# Patient Record
Sex: MALE | Race: BLACK OR AFRICAN AMERICAN | Employment: FULL TIME | URBAN - METROPOLITAN AREA
[De-identification: names, ages, dates, MRNs, and addresses within clinical notes are randomized per-mention and may not be internally consistent; named-entity substitution may affect disease eponyms.]

---

## 2024-01-11 ENCOUNTER — HOSPITAL ENCOUNTER (EMERGENCY)
Facility: HOSPITAL | Age: 24
Discharge: HOME | End: 2024-01-11
Attending: EMERGENCY MEDICINE
Payer: COMMERCIAL

## 2024-01-11 VITALS
DIASTOLIC BLOOD PRESSURE: 88 MMHG | OXYGEN SATURATION: 100 % | HEIGHT: 71 IN | HEART RATE: 63 BPM | RESPIRATION RATE: 14 BRPM | BODY MASS INDEX: 30.8 KG/M2 | TEMPERATURE: 98.4 F | SYSTOLIC BLOOD PRESSURE: 156 MMHG | WEIGHT: 220 LBS

## 2024-01-11 DIAGNOSIS — H10.31 ACUTE CONJUNCTIVITIS OF RIGHT EYE, UNSPECIFIED ACUTE CONJUNCTIVITIS TYPE: Primary | ICD-10-CM

## 2024-01-11 PROCEDURE — 99283 EMERGENCY DEPT VISIT LOW MDM: CPT

## 2024-01-11 PROCEDURE — 6370000000 HC RX 637 (ALT 250 FOR IP): Performed by: EMERGENCY MEDICINE

## 2024-01-11 RX ORDER — POLYMYXIN B SULFATE AND TRIMETHOPRIM 1; 10000 MG/ML; [USP'U]/ML
1 SOLUTION OPHTHALMIC ONCE
Status: COMPLETED | OUTPATIENT
Start: 2024-01-11 | End: 2024-01-11

## 2024-01-11 RX ORDER — POLYMYXIN B SULFATE AND TRIMETHOPRIM 1; 10000 MG/ML; [USP'U]/ML
1 SOLUTION OPHTHALMIC EVERY 4 HOURS
Qty: 1 EACH | Refills: 0 | Status: SHIPPED | OUTPATIENT
Start: 2024-01-11

## 2024-01-11 RX ADMIN — POLYMYXIN B SULFATE AND TRIMETHOPRIM 1 DROP: 10000; 1 SOLUTION OPHTHALMIC at 20:01

## 2024-01-11 ASSESSMENT — PAIN - FUNCTIONAL ASSESSMENT: PAIN_FUNCTIONAL_ASSESSMENT: NONE - DENIES PAIN

## 2024-01-11 ASSESSMENT — VISUAL ACUITY: OU: 1

## 2024-01-12 NOTE — ED TRIAGE NOTES
Pt.presents for evaluation of redness to right eye onset yesterday.. pt.states today he woke up with cold in his eye.  Using warm compresses.  Does not wear contacts/glasses.  Not around anyone with pinkeye that he is aware of.  Denies injury

## 2024-01-12 NOTE — ED NOTES
Assumed care of pt for dc purposes only. DC instructions reviewed with pt. Pt verbalizes understanding of instructions, e-scribe RX x1, and f/u care.        Medication List        START taking these medications      trimethoprim-polymyxin b 98625-9.1 UNIT/ML-% ophthalmic solution  Commonly known as: POLYTRIM  Place 1 drop into the right eye every 4 hours               Where to Get Your Medications        These medications were sent to [facility] #[id number] - [place], [place] - [address] - P [phone] - F [phone]  [facility] [facility] [address] [facility] [address]      Phone: [phone]   trimethoprim-polymyxin b 22437-2.1 UNIT/ML-% ophthalmic solution

## 2024-01-12 NOTE — ED PROVIDER NOTES
[facility] EMERGENCY DEPT  EMERGENCY DEPARTMENT ENCOUNTER      Pt Name: Donald Patel  MRN: [id number]  Birthdate 2000  Date of evaluation: 1/11/2024  Provider: Louis Adam MD    CHIEF COMPLAINT       Chief Complaint   Patient presents with    Conjunctivitis       EMERGENCY DEPARTMENT COURSE and DIFFERENTIAL DIAGNOSIS/MDM:   Medical Decision Making  23-year-old male presenting ER with report of right eye redness and some tearing.  Patient denies any trauma or injuries.  Denies any purulent discharge or drainage.  No eye pain or blurred vision or foreign body sensation.  Patient does not wear contacts or glasses.  Patient denies any congestion or headache.  No blurred vision or loss of vision.  No ear pain.  No sore throat.  Denies any sick contacts.  You just warm compress to the area.  On exam patient has conjunctival injections without discharge.  PERRLA, EOMI.  No tenderness.  No signs of any foreign bodies no obvious signs of corneal abrasions.  No abnormalities of the left eye.  No signs of any styes or eyelid abnormalities.  Cranial nerves intact.  Patient's symptoms consistent with conjunctivitis.  Will start patient on antibiotic eyedrops and given referral information for eye specialist.  Patient stable for discharge    Risk  Prescription drug management.            REASSESSMENT          HISTORY OF PRESENT ILLNESS    23-year-old male presenting ER with report of right eye redness and some tearing.         Nursing Notes were reviewed.    REVIEW OF SYSTEMS       Review of Systems      PAST MEDICAL HISTORY   History reviewed. No pertinent past medical history.      SURGICAL HISTORY     History reviewed. No pertinent surgical history.      CURRENT MEDICATIONS       Discharge Medication List as of 1/11/2024  8:03 PM          ALLERGIES     Patient has no known allergies.    FAMILY HISTORY     History reviewed. No pertinent family history.       SOCIAL HISTORY       Social History     Socioeconomic History